# Patient Record
Sex: MALE | Race: WHITE
[De-identification: names, ages, dates, MRNs, and addresses within clinical notes are randomized per-mention and may not be internally consistent; named-entity substitution may affect disease eponyms.]

---

## 2020-10-24 ENCOUNTER — HOSPITAL ENCOUNTER (EMERGENCY)
Dept: HOSPITAL 95 - ER | Age: 50
Discharge: HOME | End: 2020-10-24
Payer: COMMERCIAL

## 2020-10-24 VITALS — WEIGHT: 173 LBS | BODY MASS INDEX: 23.43 KG/M2 | HEIGHT: 72 IN

## 2020-10-24 DIAGNOSIS — K02.9: Primary | ICD-10-CM

## 2020-10-24 DIAGNOSIS — Z79.82: ICD-10-CM

## 2020-10-24 DIAGNOSIS — Z79.899: ICD-10-CM

## 2021-05-16 ENCOUNTER — HOSPITAL ENCOUNTER (OUTPATIENT)
Dept: HOSPITAL 95 - ER | Age: 51
Setting detail: OBSERVATION
LOS: 1 days | Discharge: HOME | End: 2021-05-17
Attending: INTERNAL MEDICINE | Admitting: HOSPITALIST
Payer: OTHER GOVERNMENT

## 2021-05-16 VITALS — HEIGHT: 72 IN | WEIGHT: 156.09 LBS | BODY MASS INDEX: 21.14 KG/M2

## 2021-05-16 DIAGNOSIS — I25.2: ICD-10-CM

## 2021-05-16 DIAGNOSIS — Z88.8: ICD-10-CM

## 2021-05-16 DIAGNOSIS — I65.23: ICD-10-CM

## 2021-05-16 DIAGNOSIS — E78.5: ICD-10-CM

## 2021-05-16 DIAGNOSIS — Z79.82: ICD-10-CM

## 2021-05-16 DIAGNOSIS — G43.909: Primary | ICD-10-CM

## 2021-05-16 DIAGNOSIS — R20.0: ICD-10-CM

## 2021-05-16 DIAGNOSIS — Z95.5: ICD-10-CM

## 2021-05-16 DIAGNOSIS — R53.1: ICD-10-CM

## 2021-05-16 DIAGNOSIS — K21.9: ICD-10-CM

## 2021-05-16 DIAGNOSIS — F17.210: ICD-10-CM

## 2021-05-16 DIAGNOSIS — I10: ICD-10-CM

## 2021-05-16 DIAGNOSIS — I25.10: ICD-10-CM

## 2021-05-16 LAB
ALBUMIN SERPL BCP-MCNC: 3.9 G/DL (ref 3.4–5)
ALBUMIN/GLOB SERPL: 1.1 {RATIO} (ref 0.8–1.8)
ALT SERPL W P-5'-P-CCNC: 29 U/L (ref 12–78)
ANION GAP SERPL CALCULATED.4IONS-SCNC: 5 MMOL/L (ref 6–16)
AST SERPL W P-5'-P-CCNC: 14 U/L (ref 12–37)
BASOPHILS # BLD AUTO: 0.11 K/MM3 (ref 0–0.23)
BASOPHILS NFR BLD AUTO: 1 % (ref 0–2)
BILIRUB SERPL-MCNC: 0.2 MG/DL (ref 0.1–1)
BUN SERPL-MCNC: 20 MG/DL (ref 8–24)
CALCIUM SERPL-MCNC: 9.1 MG/DL (ref 8.5–10.1)
CHLORIDE SERPL-SCNC: 106 MMOL/L (ref 98–108)
CO2 SERPL-SCNC: 27 MMOL/L (ref 21–32)
CREAT SERPL-MCNC: 0.92 MG/DL (ref 0.6–1.2)
DEPRECATED RDW RBC AUTO: 43.2 FL (ref 35.1–46.3)
EOSINOPHIL # BLD AUTO: 0.51 K/MM3 (ref 0–0.68)
EOSINOPHIL NFR BLD AUTO: 4 % (ref 0–6)
ERYTHROCYTE [DISTWIDTH] IN BLOOD BY AUTOMATED COUNT: 13.2 % (ref 11.7–14.2)
ETHANOL SERPL-MCNC: <3 MG/DL
GLOBULIN SER CALC-MCNC: 3.4 G/DL (ref 2.2–4)
GLUCOSE SERPL-MCNC: 100 MG/DL (ref 70–99)
HCT VFR BLD AUTO: 46.3 % (ref 37–53)
HGB BLD-MCNC: 15.2 G/DL (ref 13.5–17.5)
IMM GRANULOCYTES # BLD AUTO: 0.03 K/MM3 (ref 0–0.1)
IMM GRANULOCYTES NFR BLD AUTO: 0 % (ref 0–1)
LYMPHOCYTES # BLD AUTO: 4.32 K/MM3 (ref 0.84–5.2)
LYMPHOCYTES NFR BLD AUTO: 33 % (ref 21–46)
MCHC RBC AUTO-ENTMCNC: 32.8 G/DL (ref 31.5–36.5)
MCV RBC AUTO: 89 FL (ref 80–100)
MONOCYTES # BLD AUTO: 0.92 K/MM3 (ref 0.16–1.47)
MONOCYTES NFR BLD AUTO: 7 % (ref 4–13)
NEUTROPHILS # BLD AUTO: 7.21 K/MM3 (ref 1.96–9.15)
NEUTROPHILS NFR BLD AUTO: 55 % (ref 41–73)
NRBC # BLD AUTO: 0 K/MM3 (ref 0–0.02)
NRBC BLD AUTO-RTO: 0 /100 WBC (ref 0–0.2)
PLATELET # BLD AUTO: 296 K/MM3 (ref 150–400)
POTASSIUM SERPL-SCNC: 4.2 MMOL/L (ref 3.5–5.5)
PROT SERPL-MCNC: 7.3 G/DL (ref 6.4–8.2)
PROTHROMBIN TIME: 10.5 SEC (ref 9.7–11.5)
SODIUM SERPL-SCNC: 138 MMOL/L (ref 136–145)

## 2021-05-16 PROCEDURE — A9270 NON-COVERED ITEM OR SERVICE: HCPCS

## 2021-05-16 PROCEDURE — G0480 DRUG TEST DEF 1-7 CLASSES: HCPCS

## 2021-05-16 PROCEDURE — G0378 HOSPITAL OBSERVATION PER HR: HCPCS

## 2021-05-17 LAB
ALBUMIN SERPL BCP-MCNC: 3.4 G/DL (ref 3.4–5)
ALBUMIN/GLOB SERPL: 1 {RATIO} (ref 0.8–1.8)
ALT SERPL W P-5'-P-CCNC: 23 U/L (ref 12–78)
ANION GAP SERPL CALCULATED.4IONS-SCNC: 4 MMOL/L (ref 6–16)
AST SERPL W P-5'-P-CCNC: 10 U/L (ref 12–37)
BASOPHILS # BLD AUTO: 0.12 K/MM3 (ref 0–0.23)
BASOPHILS NFR BLD AUTO: 1 % (ref 0–2)
BILIRUB SERPL-MCNC: 0.3 MG/DL (ref 0.1–1)
BUN SERPL-MCNC: 21 MG/DL (ref 8–24)
CALCIUM SERPL-MCNC: 8.8 MG/DL (ref 8.5–10.1)
CHLORIDE SERPL-SCNC: 108 MMOL/L (ref 98–108)
CO2 SERPL-SCNC: 28 MMOL/L (ref 21–32)
CREAT SERPL-MCNC: 0.99 MG/DL (ref 0.6–1.2)
DEPRECATED RDW RBC AUTO: 42.9 FL (ref 35.1–46.3)
EOSINOPHIL # BLD AUTO: 0.46 K/MM3 (ref 0–0.68)
EOSINOPHIL NFR BLD AUTO: 4 % (ref 0–6)
ERYTHROCYTE [DISTWIDTH] IN BLOOD BY AUTOMATED COUNT: 13.1 % (ref 11.7–14.2)
GLOBULIN SER CALC-MCNC: 3.3 G/DL (ref 2.2–4)
GLUCOSE SERPL-MCNC: 111 MG/DL (ref 70–99)
HCT VFR BLD AUTO: 43.2 % (ref 37–53)
HGB BLD-MCNC: 14.5 G/DL (ref 13.5–17.5)
IMM GRANULOCYTES # BLD AUTO: 0.02 K/MM3 (ref 0–0.1)
IMM GRANULOCYTES NFR BLD AUTO: 0 % (ref 0–1)
LYMPHOCYTES # BLD AUTO: 4.42 K/MM3 (ref 0.84–5.2)
LYMPHOCYTES NFR BLD AUTO: 38 % (ref 21–46)
MCHC RBC AUTO-ENTMCNC: 33.6 G/DL (ref 31.5–36.5)
MCV RBC AUTO: 89 FL (ref 80–100)
MONOCYTES # BLD AUTO: 0.78 K/MM3 (ref 0.16–1.47)
MONOCYTES NFR BLD AUTO: 7 % (ref 4–13)
NEUTROPHILS # BLD AUTO: 5.79 K/MM3 (ref 1.96–9.15)
NEUTROPHILS NFR BLD AUTO: 50 % (ref 41–73)
NRBC # BLD AUTO: 0 K/MM3 (ref 0–0.02)
NRBC BLD AUTO-RTO: 0 /100 WBC (ref 0–0.2)
PLATELET # BLD AUTO: 261 K/MM3 (ref 150–400)
POTASSIUM SERPL-SCNC: 3.6 MMOL/L (ref 3.5–5.5)
PROT SERPL-MCNC: 6.7 G/DL (ref 6.4–8.2)
SODIUM SERPL-SCNC: 140 MMOL/L (ref 136–145)

## 2021-05-17 NOTE — NUR
PT DISCHARGED FROM UNIT. IV REMOVED, DISCHARGE INSTUCTIONS REVIEWED.
MEDICATIONS FAXED TO PHARMACY. PT LEFT UNIT VIA WHEEL CHAIR. WIFE TO DRIVE
HOME

## 2021-05-17 NOTE — NUR
ADMISSION: PATIENT RECIEVED FROM ER VIA STRETCHER. ABLE TO AMB. WITH STEADY
GAIT TO THE BED. VSS, REPORTING HA PAIN 8/10. PATIENT AND WIFE ARE ORIENTED TO
THE ROOM AND THE CALL BELL. MEDS ARE GIVEN PER MAR AND IV FENTANYL IS GIVEN
WITH GOOD EFFECT. PATIENT IS HUNGRY, ROAST BEEF SANDWICH IS GIVEN.

## 2021-05-17 NOTE — NUR
SHIFT SUMMARY: PATIENT HAS SLEP WELL. NO COMPLAINTS THROUGH THE NIGHT. PATIENT
IS DTV, UNABLE TO COLLECT URINE SAMPLE. VSS, NS INFUSING PER MD ORDER. NO
EVENTS ON TELEMETRY, NSR RATE IN THE 70'S. PATIENT CONTINUES TO HAVE LEFT HAND
TINGLING AND LLE WEAKNESS. IV FENTANYL GIVE X 1 FOR HEAD ACHE WAS EFFECTIVE.

## 2021-06-21 ENCOUNTER — HOSPITAL ENCOUNTER (EMERGENCY)
Dept: HOSPITAL 95 - ER | Age: 51
Discharge: LEFT BEFORE DISCHARGE | End: 2021-06-21
Payer: COMMERCIAL

## 2021-06-21 ENCOUNTER — HOSPITAL ENCOUNTER (EMERGENCY)
Dept: HOSPITAL 95 - ER | Age: 51
Discharge: HOME | End: 2021-06-21
Payer: COMMERCIAL

## 2021-06-21 VITALS — HEIGHT: 72 IN | BODY MASS INDEX: 22.35 KG/M2 | WEIGHT: 164.99 LBS

## 2021-06-21 DIAGNOSIS — I10: ICD-10-CM

## 2021-06-21 DIAGNOSIS — Z53.21: Primary | ICD-10-CM

## 2021-06-21 DIAGNOSIS — G43.909: Primary | ICD-10-CM

## 2021-06-21 DIAGNOSIS — F17.210: ICD-10-CM

## 2021-06-21 DIAGNOSIS — I25.2: ICD-10-CM

## 2021-06-21 LAB
ALBUMIN SERPL BCP-MCNC: 4.3 G/DL (ref 3.4–5)
ALBUMIN/GLOB SERPL: 1.2 {RATIO} (ref 0.8–1.8)
ALT SERPL W P-5'-P-CCNC: 37 U/L (ref 12–78)
ANION GAP SERPL CALCULATED.4IONS-SCNC: 6 MMOL/L (ref 6–16)
AST SERPL W P-5'-P-CCNC: 21 U/L (ref 12–37)
BASOPHILS # BLD AUTO: 0.12 K/MM3 (ref 0–0.23)
BASOPHILS NFR BLD AUTO: 1 % (ref 0–2)
BILIRUB SERPL-MCNC: 0.4 MG/DL (ref 0.1–1)
BUN SERPL-MCNC: 17 MG/DL (ref 8–24)
CALCIUM SERPL-MCNC: 9.5 MG/DL (ref 8.5–10.1)
CHLORIDE SERPL-SCNC: 107 MMOL/L (ref 98–108)
CO2 SERPL-SCNC: 25 MMOL/L (ref 21–32)
CREAT SERPL-MCNC: 1.08 MG/DL (ref 0.6–1.2)
DEPRECATED RDW RBC AUTO: 43.3 FL (ref 35.1–46.3)
EOSINOPHIL # BLD AUTO: 0.38 K/MM3 (ref 0–0.68)
EOSINOPHIL NFR BLD AUTO: 2 % (ref 0–6)
ERYTHROCYTE [DISTWIDTH] IN BLOOD BY AUTOMATED COUNT: 13.2 % (ref 11.7–14.2)
GLOBULIN SER CALC-MCNC: 3.5 G/DL (ref 2.2–4)
GLUCOSE SERPL-MCNC: 101 MG/DL (ref 70–99)
HCT VFR BLD AUTO: 46.3 % (ref 37–53)
HGB BLD-MCNC: 15.6 G/DL (ref 13.5–17.5)
IMM GRANULOCYTES # BLD AUTO: 0.05 K/MM3 (ref 0–0.1)
IMM GRANULOCYTES NFR BLD AUTO: 0 % (ref 0–1)
LYMPHOCYTES # BLD AUTO: 3.09 K/MM3 (ref 0.84–5.2)
LYMPHOCYTES NFR BLD AUTO: 18 % (ref 21–46)
MCHC RBC AUTO-ENTMCNC: 33.7 G/DL (ref 31.5–36.5)
MCV RBC AUTO: 89 FL (ref 80–100)
MONOCYTES # BLD AUTO: 1.24 K/MM3 (ref 0.16–1.47)
MONOCYTES NFR BLD AUTO: 7 % (ref 4–13)
NEUTROPHILS # BLD AUTO: 12.77 K/MM3 (ref 1.96–9.15)
NEUTROPHILS NFR BLD AUTO: 72 % (ref 41–73)
NRBC # BLD AUTO: 0 K/MM3 (ref 0–0.02)
NRBC BLD AUTO-RTO: 0 /100 WBC (ref 0–0.2)
PLATELET # BLD AUTO: 297 K/MM3 (ref 150–400)
POTASSIUM SERPL-SCNC: 4.8 MMOL/L (ref 3.5–5.5)
PROT SERPL-MCNC: 7.8 G/DL (ref 6.4–8.2)
SODIUM SERPL-SCNC: 138 MMOL/L (ref 136–145)

## 2021-06-21 PROCEDURE — A9270 NON-COVERED ITEM OR SERVICE: HCPCS

## 2021-06-24 ENCOUNTER — HOSPITAL ENCOUNTER (EMERGENCY)
Dept: HOSPITAL 95 - ER | Age: 51
Discharge: HOME | End: 2021-06-24
Payer: COMMERCIAL

## 2021-06-24 VITALS — WEIGHT: 159.99 LBS | BODY MASS INDEX: 21.67 KG/M2 | HEIGHT: 72 IN

## 2021-06-24 DIAGNOSIS — K04.7: Primary | ICD-10-CM

## 2021-06-24 DIAGNOSIS — Z79.82: ICD-10-CM

## 2021-06-24 DIAGNOSIS — Z79.899: ICD-10-CM

## 2021-06-24 PROCEDURE — A9270 NON-COVERED ITEM OR SERVICE: HCPCS

## 2021-09-14 ENCOUNTER — HOSPITAL ENCOUNTER (EMERGENCY)
Dept: HOSPITAL 95 - ER | Age: 51
Discharge: HOME | End: 2021-09-14
Payer: COMMERCIAL

## 2021-09-14 VITALS — HEIGHT: 72 IN | BODY MASS INDEX: 23.7 KG/M2 | WEIGHT: 175 LBS

## 2021-09-14 DIAGNOSIS — I10: ICD-10-CM

## 2021-09-14 DIAGNOSIS — G43.909: Primary | ICD-10-CM

## 2021-09-14 DIAGNOSIS — I25.2: ICD-10-CM

## 2021-09-14 DIAGNOSIS — Z79.899: ICD-10-CM

## 2021-09-14 DIAGNOSIS — Z79.82: ICD-10-CM

## 2021-09-14 DIAGNOSIS — F17.210: ICD-10-CM

## 2021-09-14 DIAGNOSIS — Z88.8: ICD-10-CM

## 2021-09-14 DIAGNOSIS — R09.81: ICD-10-CM

## 2021-09-14 PROCEDURE — A9270 NON-COVERED ITEM OR SERVICE: HCPCS
